# Patient Record
Sex: FEMALE | Race: WHITE | NOT HISPANIC OR LATINO | URBAN - NONMETROPOLITAN AREA
[De-identification: names, ages, dates, MRNs, and addresses within clinical notes are randomized per-mention and may not be internally consistent; named-entity substitution may affect disease eponyms.]

---

## 2017-07-26 ENCOUNTER — IMPORTED ENCOUNTER (OUTPATIENT)
Dept: URBAN - NONMETROPOLITAN AREA CLINIC 1 | Facility: CLINIC | Age: 72
End: 2017-07-26

## 2017-07-26 PROBLEM — H43.813: Noted: 2017-07-26

## 2017-07-26 PROBLEM — H35.013: Noted: 2017-07-26

## 2017-07-26 PROBLEM — Z96.1: Noted: 2017-07-26

## 2017-07-26 PROBLEM — H35.40: Noted: 2017-07-26

## 2017-07-26 PROCEDURE — 99204 OFFICE O/P NEW MOD 45 MIN: CPT

## 2017-07-26 NOTE — PATIENT DISCUSSION
floaters / vitreous syneresis OU without retinal complicationReviewed signs and symptoms RD /handout provided. Peripheral retinal pigment degeneration with no retinal breaks. PCIOL in position OU / no significant PCOSignificant arteriolar narrowing and A/V knicking; high risk for retinalvascular occlusion at some point especially in OS. If no contraindications recommend PCP consider blood thinners.

## 2018-03-15 NOTE — PROCEDURE NOTE: CLINICAL
PROCEDURE NOTE: Perm Epilation Right Lower Lid. Diagnosis: Trichiasis. Prior to treatment, the risks/benefits/alternatives were discussed. The patient wished to proceed with procedure. 0.5 cc of 2% lidocaine with epinephrine was injected in the eyelid adjacent to the misdirected lashes. The hyfrecator was used to remove each individual misdirected lash. Patient tolerated procedure well. There were no complications. Post procedure instructions given. Lorraine Patino

## 2021-07-12 ENCOUNTER — IMPORTED ENCOUNTER (OUTPATIENT)
Dept: URBAN - NONMETROPOLITAN AREA CLINIC 1 | Facility: CLINIC | Age: 76
End: 2021-07-12

## 2021-07-12 PROBLEM — H52.223: Noted: 2021-07-12

## 2021-07-12 PROBLEM — H26.493: Noted: 2021-07-12

## 2021-07-12 PROBLEM — H52.03: Noted: 2021-07-12

## 2021-07-12 PROBLEM — Z96.1: Noted: 2021-07-12

## 2021-07-12 PROBLEM — H52.4: Noted: 2021-07-12

## 2021-07-12 PROBLEM — H43.813: Noted: 2021-07-12

## 2021-07-12 PROCEDURE — 92004 COMPRE OPH EXAM NEW PT 1/>: CPT

## 2021-07-12 PROCEDURE — 92015 DETERMINE REFRACTIVE STATE: CPT

## 2021-07-12 NOTE — PATIENT DISCUSSION
Pseudophakia w/PCO OU-  discussed findings w/patient-  worsening vision noted at this time-  refer to [address] [place] [address] for PCO eval-  continue to monitor as per JSPVD OU-  discussed findings w/patient -  Retina flat 360 with no breaks tears or heme. -  S&S of RD/RT reviewed with pt. -  Stressed that pt should contact our office right away with any changes or increase in symptoms.-  RTC 1 year or prnMixed Astigmatism OD/Compound Hyperopic Astigmatism OS w/Presbyopia-  discussed findings w/patient-  no spectacle Rx issued-  refer to [address] [place] [address] for PCO eval-  continue to monitor as per [address] [place] [address]; 's Notes: MR 7/12/2021DFE 7/12/2021

## 2021-10-26 ENCOUNTER — IMPORTED ENCOUNTER (OUTPATIENT)
Dept: URBAN - NONMETROPOLITAN AREA CLINIC 1 | Facility: CLINIC | Age: 76
End: 2021-10-26

## 2021-10-26 PROCEDURE — 99214 OFFICE O/P EST MOD 30 MIN: CPT

## 2021-10-26 PROCEDURE — 66821 AFTER CATARACT LASER SURGERY: CPT

## 2021-10-26 NOTE — PATIENT DISCUSSION
"PCO-Explained PCO and RBAs of YAG Capsulotomy to pt. -Pt elects to proceed. YAG Caps OD today and YAG Caps OS n/a -Written consent signed & obtained prior to procedureNOTES BELOW FROM PREVIOUS: Pseudophakia w/PCO OU-  discussed findings w/patient-  worsening vision noted at this time-  refer to [facility] for PCO eval-  continue to monitor as per JSPVD OU-  discussed findings w/patient -  Retina flat 360 with no breaks tears or heme. -  S&S of RD/RT reviewed with pt. -  Stressed that pt should contact our office right away with any changes or increase in symptoms.-  RTC 1 year or prnMixed Astigmatism OD/Compound Hyperopic Astigmatism OS w/Presbyopia-  discussed findings w/patient-  no spectacle Rx issued-  refer to [facility] for PCO eval-  continue to monitor as per [facility]""; 's Notes: MR 7/12/2021DFE 7/12/2021"

## 2021-11-29 ENCOUNTER — IMPORTED ENCOUNTER (OUTPATIENT)
Dept: URBAN - NONMETROPOLITAN AREA CLINIC 1 | Facility: CLINIC | Age: 76
End: 2021-11-29

## 2021-11-29 PROCEDURE — 99024 POSTOP FOLLOW-UP VISIT: CPT

## 2021-11-29 NOTE — PATIENT DISCUSSION
s/p YAG PC OS 10/26/2021-  discussed findings w/patient-  she is doing well pov OS-  note: listed in previous note as having YAG OD but OS has been done-  patient will return as scheduled for YAG PC OD; 's Notes: MR 7/12/2021DFE 7/12/2021

## 2022-02-12 ASSESSMENT — VISUAL ACUITY
OS_CC: 20/30
OD_CC: 20/30
OU_CC: 20/25-2

## 2022-02-12 ASSESSMENT — TONOMETRY
OD_IOP_MMHG: 15
OS_IOP_MMHG: 14

## 2022-04-15 ASSESSMENT — VISUAL ACUITY
OD_GLARE: 20/60
OD_SC: 20/40-2
OS_CC: 20/80-1
OS_SC: 20/30
OD_PH: 20/30
OS_SC: 20/30
OD_SC: 20/50
OU_SC: 20/30
OD_CC: 20/25
OU_CC: 20/50-2
OU_CC: 20/25
OS_CC: 20/25
OD_SC: 20/40
OS_GLARE: 20/70
OS_SC: 20/30-1

## 2022-04-15 ASSESSMENT — TONOMETRY
OS_IOP_MMHG: 15
OD_IOP_MMHG: 15

## 2022-07-29 ENCOUNTER — CLINIC PROCEDURE ONLY (OUTPATIENT)
Dept: URBAN - NONMETROPOLITAN AREA CLINIC 4 | Facility: CLINIC | Age: 77
End: 2022-07-29

## 2022-07-29 DIAGNOSIS — H26.491: ICD-10-CM

## 2022-07-29 PROCEDURE — 66821 AFTER CATARACT LASER SURGERY: CPT

## 2022-07-29 ASSESSMENT — VISUAL ACUITY
OD_CC: 20/50+2
OD_BAT: 20/70
OS_CC: 20/30
OD_PH: 20/40

## 2022-07-29 ASSESSMENT — TONOMETRY
OD_IOP_MMHG: 16
OS_IOP_MMHG: 16

## 2022-07-29 NOTE — PATIENT DISCUSSION
s/p YAG PC OS 10/26/2021-  discussed findings w/patient-  she is doing well pov OS-  note: listed in previous note as having YAG OD but OS has been done-  patient will return as scheduled for YAG PC OD; 's Notes: MR 7/12/2021DFE 7/12/2021.

## 2022-07-29 NOTE — PROCEDURE NOTE: CLINICAL
PROCEDURE NOTE: YAG Capsulotomy OD. Diagnosis: Other Secondary Cataract. Anesthesia: Topical. The purpose and nature of the procedure, possible alternative methods of treatment, the risks involved and the possibility of complications were discussed with patient. The Patient wishes to proceed and the consent was signed. 1 gtt Prolensa applied. The laser was then performed under topical anesthesia with no complications. Post op instructions were given to patient as well as a follow-up appointment. Patient was advised to call our office if any questions or concerns. Giles Cherry

## 2022-08-24 ENCOUNTER — POST-OP (OUTPATIENT)
Dept: URBAN - NONMETROPOLITAN AREA CLINIC 4 | Facility: CLINIC | Age: 77
End: 2022-08-24

## 2022-08-24 DIAGNOSIS — Z98.890: ICD-10-CM

## 2022-08-24 PROCEDURE — 99024 POSTOP FOLLOW-UP VISIT: CPT

## 2022-08-24 ASSESSMENT — VISUAL ACUITY
OD_CC: 20/40
OS_CC: 20/30

## 2022-08-24 ASSESSMENT — TONOMETRY
OD_IOP_MMHG: 14
OS_IOP_MMHG: 14